# Patient Record
Sex: FEMALE
[De-identification: names, ages, dates, MRNs, and addresses within clinical notes are randomized per-mention and may not be internally consistent; named-entity substitution may affect disease eponyms.]

---

## 2018-03-27 ENCOUNTER — HOSPITAL ENCOUNTER (EMERGENCY)
Dept: HOSPITAL 14 - H.ER | Age: 43
Discharge: HOME | End: 2018-03-27
Payer: SELF-PAY

## 2018-03-27 VITALS — TEMPERATURE: 98.3 F | SYSTOLIC BLOOD PRESSURE: 140 MMHG | DIASTOLIC BLOOD PRESSURE: 78 MMHG | HEART RATE: 88 BPM

## 2018-03-27 VITALS — RESPIRATION RATE: 18 BRPM | OXYGEN SATURATION: 99 %

## 2018-03-27 VITALS — BODY MASS INDEX: 34.9 KG/M2

## 2018-03-27 DIAGNOSIS — K29.70: Primary | ICD-10-CM

## 2018-03-27 LAB
ALBUMIN SERPL-MCNC: 4.3 G/DL (ref 3.5–5)
ALBUMIN/GLOB SERPL: 1.1 {RATIO} (ref 1–2.1)
ALT SERPL-CCNC: 44 U/L (ref 9–52)
APTT BLD: 30.8 SECONDS (ref 25.6–37.1)
AST SERPL-CCNC: 25 U/L (ref 14–36)
BASOPHILS # BLD AUTO: 0 K/UL (ref 0–0.2)
BASOPHILS NFR BLD: 0.4 % (ref 0–2)
BILIRUB UR-MCNC: NEGATIVE MG/DL
BUN SERPL-MCNC: 15 MG/DL (ref 7–17)
CALCIUM SERPL-MCNC: 9.6 MG/DL (ref 8.4–10.2)
COLOR UR: (no result)
EOSINOPHIL # BLD AUTO: 0.2 K/UL (ref 0–0.7)
EOSINOPHIL NFR BLD: 2.3 % (ref 0–4)
ERYTHROCYTE [DISTWIDTH] IN BLOOD BY AUTOMATED COUNT: 17.8 % (ref 11.5–14.5)
GFR NON-AFRICAN AMERICAN: > 60
GLUCOSE UR STRIP-MCNC: (no result) MG/DL
HGB BLD-MCNC: 10.6 G/DL (ref 12–16)
INR PPP: 1 (ref 0.9–1.2)
LEUKOCYTE ESTERASE UR-ACNC: (no result) LEU/UL
LIPASE SERPL-CCNC: 88 U/L (ref 23–300)
LYMPHOCYTES # BLD AUTO: 2.4 K/UL (ref 1–4.3)
LYMPHOCYTES NFR BLD AUTO: 31.4 % (ref 20–40)
MCH RBC QN AUTO: 24.8 PG (ref 27–31)
MCHC RBC AUTO-ENTMCNC: 33 G/DL (ref 33–37)
MCV RBC AUTO: 75.2 FL (ref 81–99)
MONOCYTES # BLD: 0.5 K/UL (ref 0–0.8)
MONOCYTES NFR BLD: 6 % (ref 0–10)
NEUTROPHILS # BLD: 4.6 K/UL (ref 1.8–7)
NEUTROPHILS NFR BLD AUTO: 59.9 % (ref 50–75)
NRBC BLD AUTO-RTO: 0.1 % (ref 0–0)
PH UR STRIP: 7 [PH] (ref 5–8)
PLATELET # BLD: 273 K/UL (ref 130–400)
PMV BLD AUTO: 8.1 FL (ref 7.2–11.7)
PROT UR STRIP-MCNC: NEGATIVE MG/DL
PROTHROMBIN TIME: 11.2 SECONDS (ref 9.8–13.1)
RBC # BLD AUTO: 4.29 MIL/UL (ref 3.8–5.2)
RBC # UR STRIP: NEGATIVE /UL
SP GR UR STRIP: 1.01 (ref 1–1.03)
SQUAMOUS EPITHIAL: < 1 /HPF (ref 0–5)
URINE CLARITY: CLEAR
UROBILINOGEN UR-MCNC: (no result) MG/DL (ref 0.2–1)
WBC # BLD AUTO: 7.6 K/UL (ref 4.8–10.8)

## 2018-03-27 PROCEDURE — 96360 HYDRATION IV INFUSION INIT: CPT

## 2018-03-27 PROCEDURE — 99285 EMERGENCY DEPT VISIT HI MDM: CPT

## 2018-03-27 PROCEDURE — 81025 URINE PREGNANCY TEST: CPT

## 2018-03-27 PROCEDURE — 85025 COMPLETE CBC W/AUTO DIFF WBC: CPT

## 2018-03-27 PROCEDURE — 85730 THROMBOPLASTIN TIME PARTIAL: CPT

## 2018-03-27 PROCEDURE — 83690 ASSAY OF LIPASE: CPT

## 2018-03-27 PROCEDURE — 81003 URINALYSIS AUTO W/O SCOPE: CPT

## 2018-03-27 PROCEDURE — 86900 BLOOD TYPING SEROLOGIC ABO: CPT

## 2018-03-27 PROCEDURE — 80053 COMPREHEN METABOLIC PANEL: CPT

## 2018-03-27 PROCEDURE — 85610 PROTHROMBIN TIME: CPT

## 2018-03-27 PROCEDURE — 86850 RBC ANTIBODY SCREEN: CPT

## 2018-03-27 NOTE — CP.PCM.CON
History of Present Illness





- History of Present Illness


History of Present Illness: 


General Surgery Consult Note for Dr. Cardenas





This 43F with no PMH presents to the ED due to 2 months of constant abdominal 

pain. Nothing makes it better and food makes it worse particularly coffee and 

tea. She has not taken any medication for this abdominal pain. She was seen 

earlier today in clinic and went for an outpatient US which showed GB stones a 

4mm CBD and a 3.15mm GB wall. She denies any fevers or chills at home reports 

nausea no vomiting. She has regular bowel movements. 





PMH: Denies


PSH: Denies


ALL: NKDA


Social: 1/4ppd, denies ETOH or drugs








Review of Systems





- Review of Systems


All systems: reviewed and no additional remarkable complaints except





- Gastrointestinal


Gastrointestinal: Abdominal Pain, Dyspepsia, Nausea.  absent: Dysphagia, 

Hematemesis, Hematochezia, Loose Stools, Vomiting





Past Patient History





- Infectious Disease


Hx of Infectious Diseases: None





- Past Social History


Smoking Status: Never Smoked





- MUSCULOSKELETAL/RHEUMATOLOGICAL


Hx Falls: No





- PSYCHIATRIC


Hx Substance Use: No





- SURGICAL HISTORY


Hx Surgeries: No





- ANESTHESIA


Hx Anesthesia: No





Meds


Home Medications: 


 Home Medication List











 Medication  Instructions  Recorded  Confirmed  Type


 


Pantoprazole Sodium [Protonix] 40 mg PO DAILY PRN #10 tablet. 03/27/18  Rx











Allergies/Adverse Reactions: 


 Allergies











Allergy/AdvReac Type Severity Reaction Status Date / Time


 


No Known Allergies Allergy   Verified 06/06/16 23:27














- Medications


Medications: 


 Current Medications





Sodium Chloride (Sodium Chloride 0.9%)  1,000 mls @ 150 mls/hr IV .Q6H40M STA


   Stop: 03/27/18 22:43


   Last Admin: 03/27/18 17:28 Dose:  150 mls/hr











Physical Exam





- Constitutional


Appears: Non-toxic, No Acute Distress





- Head Exam


Head Exam: ATRAUMATIC, NORMOCEPHALIC





- Eye Exam


Eye Exam: EOMI, Normal appearance





- ENT Exam


ENT Exam: Mucous Membranes Moist





- Respiratory Exam


Respiratory Exam: NORMAL BREATHING PATTERN





- Cardiovascular Exam


Cardiovascular Exam: +S1, +S2





- GI/Abdominal Exam


GI & Abdominal Exam: Soft, Tenderness.  absent: Distended, Firm, Guarding, 

Hernia, Rigid


Additional comments: 


Generalized tenderness most prominent in epigastrium, no murphys sign








- Neurological Exam


Neurological exam: Alert, Oriented x3





- Psychiatric Exam


Psychiatric exam: Normal Affect, Normal Mood





- Skin


Skin Exam: Dry, Intact





Results





- Vital Signs


Recent Vital Signs: 


 Last Vital Signs











Temp  98.4 F   03/27/18 14:30


 


Pulse  74   03/27/18 14:30


 


Resp  18   03/27/18 14:30


 


BP  137/67   03/27/18 14:30


 


Pulse Ox  99   03/27/18 18:38














- Labs


Result Diagrams: 


 03/27/18 16:50





 03/27/18 16:50


Labs: 


 Laboratory Results - last 24 hr











  03/27/18 03/27/18 03/27/18





  16:25 16:50 16:50


 


WBC   7.6 


 


RBC   4.29 


 


Hgb   10.6 L 


 


Hct   32.3 L 


 


MCV   75.2 L 


 


MCH   24.8 L 


 


MCHC   33.0 


 


RDW   17.8 H 


 


Plt Count   273 


 


MPV   8.1 


 


Neut % (Auto)   59.9 


 


Lymph % (Auto)   31.4 


 


Mono % (Auto)   6.0 


 


Eos % (Auto)   2.3 


 


Baso % (Auto)   0.4 


 


Neut # (Auto)   4.6 


 


Lymph # (Auto)   2.4 


 


Mono # (Auto)   0.5 


 


Eos # (Auto)   0.2 


 


Baso # (Auto)   0.0 


 


PT   


 


INR   


 


APTT   


 


Sodium    141


 


Potassium    4.3


 


Chloride    102


 


Carbon Dioxide    24


 


Anion Gap    19


 


BUN    15


 


Creatinine    0.7


 


Est GFR ( Amer)    > 60


 


Est GFR (Non-Af Amer)    > 60


 


Random Glucose    108 H


 


Calcium    9.6


 


Total Bilirubin    0.2


 


AST    25


 


ALT    44


 


Alkaline Phosphatase    92


 


Total Protein    8.0


 


Albumin    4.3


 


Globulin    3.7


 


Albumin/Globulin Ratio    1.1


 


Lipase    88


 


Urine Color   


 


Urine Clarity   


 


Urine pH   


 


Ur Specific Gravity   


 


Urine Protein   


 


Urine Glucose (UA)   


 


Urine Ketones   


 


Urine Blood   


 


Urine Nitrate   


 


Urine Bilirubin   


 


Urine Urobilinogen   


 


Ur Leukocyte Esterase   


 


Urine RBC (Auto)   


 


Urine Microscopic WBC   


 


Ur Squamous Epith Cells   


 


Blood Type  O POSITIVE  


 


Antibody Screen  Negative  


 


BBK History Checked  Patient has bt  














  03/27/18 03/27/18





  16:50 17:30


 


WBC  


 


RBC  


 


Hgb  


 


Hct  


 


MCV  


 


MCH  


 


MCHC  


 


RDW  


 


Plt Count  


 


MPV  


 


Neut % (Auto)  


 


Lymph % (Auto)  


 


Mono % (Auto)  


 


Eos % (Auto)  


 


Baso % (Auto)  


 


Neut # (Auto)  


 


Lymph # (Auto)  


 


Mono # (Auto)  


 


Eos # (Auto)  


 


Baso # (Auto)  


 


PT  11.2 


 


INR  1.0 


 


APTT  30.8 


 


Sodium  


 


Potassium  


 


Chloride  


 


Carbon Dioxide  


 


Anion Gap  


 


BUN  


 


Creatinine  


 


Est GFR ( Amer)  


 


Est GFR (Non-Af Amer)  


 


Random Glucose  


 


Calcium  


 


Total Bilirubin  


 


AST  


 


ALT  


 


Alkaline Phosphatase  


 


Total Protein  


 


Albumin  


 


Globulin  


 


Albumin/Globulin Ratio  


 


Lipase  


 


Urine Color   Straw


 


Urine Clarity   Clear


 


Urine pH   7.0


 


Ur Specific Gravity   1.013


 


Urine Protein   Negative


 


Urine Glucose (UA)   Neg


 


Urine Ketones   Negative


 


Urine Blood   Negative


 


Urine Nitrate   Negative


 


Urine Bilirubin   Negative


 


Urine Urobilinogen   0.2-1.0


 


Ur Leukocyte Esterase   Neg


 


Urine RBC (Auto)   1


 


Urine Microscopic WBC   < 1


 


Ur Squamous Epith Cells   < 1


 


Blood Type  


 


Antibody Screen  


 


BBK History Checked  














Assessment & Plan





- Assessment and Plan (Free Text)


Assessment: 


43F with abdominal pain secondary to biliary colic vs gastritis








Vital Signs stable


No Leukocytosis or Left Shift


No elevation of liver enzymes


GB US shows stones without any significant sonographic signs of acute 

cholecystitis


Recommend PPI


Return to ed if you develop fevers chills chest pain SOB or any other alarming 

symptoms


Followup with Dr. Cardenas in office tomorrow


Discussed with Dr. Theresa Hamilton PGY2

## 2018-03-27 NOTE — ED PDOC
HPI: Abdomen


Time Seen by Provider: 03/27/18 15:30


Chief Complaint (Nursing): Abdominal Pain


History Per: Patient


Additional Complaint(s): 





Pt. states she had an abd US done today and was called by [facility] to come to ED as 

she may have cholecystitis. Reports she's had RUQ pain since 2/14/2018 and she 

was seen in [facility] ED and admitted on 02/19/2018 for pancolitis and also had 

gallstones at that time but no cholecystitis. RUQ pain has been intermittent 

but has not increased in severity from 02/14/2018. Reports also having 

intermittent nausea but no vomiting. Pain is worse after eating any types of 

food. Denies fever, vomiting, diarrhea, previous abdominal surgeries, chest pain

, SOB, palpitations, hemoptysis. 





Past Medical History


Reviewed: Historical Data, Nursing Documentation, Vital Signs


Vital Signs: 


 Last Vital Signs











Temp  98.3 F   03/27/18 18:50


 


Pulse  88   03/27/18 18:50


 


Resp  18   03/27/18 18:50


 


BP  140/78   03/27/18 18:50


 


Pulse Ox  99   03/27/18 18:50














- Medical History


PMH: 


   Denies: Diabetes





- Family History


Family History: States: Unknown Family Hx





- Home Medications


Home Medications: 


 Ambulatory Orders











 Medication  Instructions  Recorded


 


Ibuprofen [Motrin Tab] 400 mg PO PRN PRN 02/20/18


 


Acetaminophen [Tylenol 325mg tab] 650 mg PO Q6 PRN  tab 02/21/18


 


Ciprofloxacin HCl [Cipro] 500 mg PO Q12H 7 Days #14 tablet 02/21/18


 


Metronidazole [Flagyl] 500 mg PO TID 7 Days #21 tablet 02/21/18


 


oxyCODONE/Acetaminophen [Percocet 1 ea PO Q6H #20 tab 02/21/18





5/325 mg Tab]  


 


Pantoprazole Sodium [Protonix] 40 mg PO DAILY PRN #10 tablet. 03/27/18














- Allergies


Allergies/Adverse Reactions: 


 Allergies











Allergy/AdvReac Type Severity Reaction Status Date / Time


 


No Known Allergies Allergy   Verified 06/06/16 23:27














Review of Systems


ROS Statement: Except As Marked, All Systems Reviewed And Found Negative


Gastrointestinal: Positive for: Nausea, Abdominal Pain





Physical Exam





- Physical Exam


Appears: Positive for: Well, Non-toxic, No Acute Distress


Skin: Positive for: Normal Color, Warm.  Negative for: Rash, Jaundice


Eye Exam: Positive for: Normal appearance.  Negative for: Scleral icterus


Cardiovascular/Chest: Positive for: Regular Rate, Rhythm


Respiratory: Positive for: Normal Breath Sounds.  Negative for: Respiratory 

Distress


Gastrointestinal/Abdominal: Positive for: Normal Exam, Bowel Sounds, Soft, 

Tenderness (minimal epigastric tenderness; no RUQ tenderness; negative Baum's 

sign).  Negative for: Organomegaly


Back: Positive for: Normal Inspection.  Negative for: L CVA Tenderness, R CVA 

Tenderness


Neurologic/Psych: Positive for: Alert, Oriented.  Negative for: Aphasia, Facial 

Droop





- Laboratory Results


Result Diagrams: 


 03/27/18 16:50





 03/27/18 16:50





- ECG


O2 Sat by Pulse Oximetry: 99





- Progress


ED Course And Treament: 





Labs ordered.


Abd US (03/27/2018): cholelithiasis with borderline wall thickening and 

positive sonographic Baum's sign; findings are non-specific but raise the 

possibility of acute cholecystitis 





1830


Pt. evaluated by Dr. Hamilton and who then spoke with Dr. Cardenas regarding case 

and cleared pt. for discharge. Requests that pt. f/u in surgery clinic and for 

pt. to be prescribed protonix. 





1836


On re-evaluation, pt. in no distress. Informed of plan and instructed to f/u 

with [facility] for further evaluation. 





Disposition





- Clinical Impression


Clinical Impression: 


 Gastritis








- Patient ED Disposition


Is Patient to be Admitted: No





- Disposition


Referrals: 


[facility] [Outside]


Disposition: Routine/Home


Disposition Time: 18:37


Condition: IMPROVED


Additional Instructions: 


Follow up with [facility] for further evaluation.


Return to ED immediately if symptoms persist or worsen. 


Prescriptions: 


Pantoprazole Sodium [Protonix] 40 mg PO DAILY PRN #10 tablet.dr


 PRN Reason: abdominal pain


Instructions:  Gastritis (DC)


Forms:  TxtFeedback (Azerbaijani)


Print Language: Omani

## 2018-08-01 ENCOUNTER — HOSPITAL ENCOUNTER (OUTPATIENT)
Dept: HOSPITAL 14 - H.OPSURG | Age: 43
Discharge: HOME | End: 2018-08-01
Payer: SELF-PAY

## 2018-08-01 VITALS
DIASTOLIC BLOOD PRESSURE: 66 MMHG | HEART RATE: 54 BPM | RESPIRATION RATE: 18 BRPM | SYSTOLIC BLOOD PRESSURE: 129 MMHG | TEMPERATURE: 99.1 F

## 2018-08-01 VITALS — BODY MASS INDEX: 33.9 KG/M2

## 2018-08-01 VITALS — OXYGEN SATURATION: 99 %

## 2018-08-01 DIAGNOSIS — R12: ICD-10-CM

## 2018-08-01 DIAGNOSIS — D64.9: ICD-10-CM

## 2018-08-01 DIAGNOSIS — K80.20: Primary | ICD-10-CM

## 2018-08-01 PROCEDURE — 36415 COLL VENOUS BLD VENIPUNCTURE: CPT

## 2018-08-01 PROCEDURE — 86900 BLOOD TYPING SEROLOGIC ABO: CPT

## 2018-08-01 PROCEDURE — 88304 TISSUE EXAM BY PATHOLOGIST: CPT

## 2018-08-01 PROCEDURE — 86850 RBC ANTIBODY SCREEN: CPT

## 2018-08-01 PROCEDURE — 47562 LAPAROSCOPIC CHOLECYSTECTOMY: CPT

## 2018-08-01 NOTE — PCM.SURG1
Surgeon's Initial Post Op Note





- Surgeon's Notes


Surgeon: Dr. Jacobs


Assistant: Dr. Vee PGY2, Dr. Caicedo PGY4


Type of Anesthesia: General Endo


Anesthesia Administered By: Yong


Pre-Operative Diagnosis: Symptomatic Cholelithiasis


Operative Findings: Chronic Cholecystitis, Cholelithiasis


Post-Operative Diagnosis: Chronic Cholecystitis, Cholelithiasis


Operation Performed: Laparoscopic Cholecystectomy


Specimen/Specimens Removed: Gallbladder


Estimated Blood Loss: EBL {In ML}: 5


Blood Products Given: N/A


Drains Used: No Drains


Post-Op Condition: Good


Date of Surgery/Procedure: 08/01/18


Time of Surgery/Procedure: 13:59

## 2018-08-01 NOTE — CP.SDSHP
<Inez Vee - Last Filed: 08/01/18 13:54>





[facility] Day Surgery [facility]





- History


Proposed Procedure: Laparoscopic cholecystectomy


Pre-Op Diagnosis: Cholelithiasis





- Previous Medical/Surgical History


Pain: 4.Moderate Pain


Previous Surgical History: Denies





- Allergies


Allergies: 


Allergies





No Known Allergies Allergy (Verified 08/01/18 11:34)


 











- Physical Exam


General Appearance: NAD, well developed


Vital Signs: 


 Vital Signs











  08/01/18 08/01/18





  11:14 11:20


 


Temperature 98.5 F 


 


Pulse Rate 53 L 53 L


 


Respiratory 20 





Rate  


 


Blood Pressure 125/66 


 


O2 Sat by Pulse 100 





Oximetry  











Mental Status: Alert & Oriented x3


Neuro: WNL


Heart: WNL


Lungs: WNL


GI: Other (Tender to palpation in epigastric area and LUQ, no masses appreciated

)


Social History: Smoking (10 yr hx of #3 cigarettes daily)





- Impression


Impression: symptomatic cholelithiasis





- Date & Time


Date: 08/01/18


Time: 11:56





Short Stay Discharge





- Short Stay Discharge


Admitting Diagnosis/Reason for Visit: K80.20


Disposition: HOME/ ROUTINE


Referrals: 


Pooja Mccauley MD [Primary Care Provider] - 


Follow-up: 





Please call Dr. Jacobs's clinic to follow up in 1-2 weeks


Instructions:  Cholecystectomy, Laparoscopic Surgery, Gallstones (DC)


Additional Instructions (Diet, Activity): 


Ok to resume normal diet


No heavy lifting for 4-6 weeks


You have glue over your incisions, please do not remove it, it will fall off on 

it's own


Ok to shower





<Verenice Caicedo - Last Filed: 08/01/18 14:00>





[facility]





- Allergies


Allergies: 


Allergies





No Known Allergies Allergy (Verified 08/01/18 11:34)


 











- Physical Exam


Vital Signs: 


 Vital Signs











  08/01/18 08/01/18





  11:14 11:20


 


Temperature 98.5 F 


 


Pulse Rate 53 L 53 L


 


Respiratory 20 





Rate  


 


Blood Pressure 125/66 


 


O2 Sat by Pulse 100 





Oximetry

## 2018-08-02 NOTE — OP
Copied To:  Verenice Caicedo DO

Attending MD:  Denis Jacobs MD



PROCEDURE DATE:  08/01/2018



SURGEON:  Denis Jacobs MD



ASSISTANTS:  Verenice Caicedo DO, PGY-4 and Inez Vee DO, PGY-2



ANESTHESIOLOGIST:  Dr. JORGE L Beach



ANESTHESIA:  General endotracheal.



PREOPERATIVE DIAGNOSIS:  Symptomatic cholelithiasis.



POSTOPERATIVE DIAGNOSIS:  Chronic cholelithiasis.



FINDINGS:  Chronically inflamed gallbladder.



BLOOD LOSS:  5 mL.



DRAINS:  None.



COMPLICATIONS:  None.



DESCRIPTION OF PROCEDURE:  This is a 43-year-old obese prediabetic female

patient who presented after being evaluated with intermittent right upper

quadrant pain for the past three months, seen in the emergency room,

diagnosed with cholelithiasis without cholecystitis.  _____ to schedule

elective cholecystectomy.



The patient was consented for laparoscopic cholecystectomy, possible open.



The patient was placed on the operating table in the supine position. 

General anesthesia was induced.  A timeout was completed verifying correct

patient, procedure site, positioning prior to the procedure.  Preoperative

antibiotics were administered.  A nasogastric tube was inserted.  The

abdomen was prepped and draped in the usual sterile fashion.  An

infraumbilical incision was made.  The Veress needle was introduced, and

the abdomen was insufflated.  An 11 trocar was inserted into the

infraumbilical incision.  Camera introduced.  An epigastric incision was

completed for a size 11 trocar, and then two 5 mm trocar incisions were

made in the left mid axillary flank, lower abdominal quadrant.  We

attempted to grasp the fundus of the gallbladder, and the decision was to

decompress the gallbladder using a needle for decompression which was

performed with findings of hydrops of the gallbladder.  After decompression

of the gallbladder, the fundus of the gallbladder was grasped and retracted

to the contralateral shoulder above the liver.  An extensive number of

adhesions of omentum were noted on the body of the gallbladder and were

bluntly dissected off the gallbladder.  Traction was placed on the fundus

of the gallbladder.  The cystic duct was dissected and identified, and

three clips were placed prior to division of the cystic duct.  Dissection

was performed posterior to the cystic duct.  The cystic artery was

identified and triply clipped and then divided starting at the fundus and

continuing downward to the cystic duct and artery.  The peritoneum

overlying was excised, and the gallbladder was dissected off the liver bed.

The gallbladder was successfully removed from the liver bed using spatula

electrocautery with good hemostasis.  The gallbladder was then placed into

an EndoCatch bag and removed through the infraumbilical incision.  The

infraumbilical incision was closed using a _____ suture in a

figure-of-eight configuration with a safety suture also applied.  The

fascia was closed using the 0 Vicryl suture.  The skin was then closed

using 4-0 Monocryl with good hemostasis.  The patient tolerated the

procedure well, was extubated in the operating room, and taken to the

postanesthesia care unit in stable condition.







__________________________________________

Verenice Caicedo DO





__________________________________________

Denis Jacobs MD



DD:  08/01/2018 14:15:58

DT:  08/02/2018 0:29:24

Job # [id number]

## 2019-02-25 ENCOUNTER — HOSPITAL ENCOUNTER (EMERGENCY)
Dept: HOSPITAL 14 - H.ER | Age: 44
LOS: 1 days | Discharge: HOME | End: 2019-02-26
Payer: SELF-PAY

## 2019-02-25 VITALS — BODY MASS INDEX: 33.9 KG/M2

## 2019-02-25 VITALS — OXYGEN SATURATION: 100 %

## 2019-02-25 DIAGNOSIS — O20.0: Primary | ICD-10-CM

## 2019-02-25 DIAGNOSIS — O26.899: ICD-10-CM

## 2019-02-25 LAB
BASOPHILS # BLD AUTO: 0 K/UL (ref 0–0.2)
BASOPHILS NFR BLD: 0.4 % (ref 0–2)
EOSINOPHIL # BLD AUTO: 0.2 K/UL (ref 0–0.7)
EOSINOPHIL NFR BLD: 2.8 % (ref 0–4)
ERYTHROCYTE [DISTWIDTH] IN BLOOD BY AUTOMATED COUNT: 19.2 % (ref 11.5–14.5)
HGB BLD-MCNC: 9.4 G/DL (ref 12–16)
LYMPHOCYTES # BLD AUTO: 1.9 K/UL (ref 1–4.3)
LYMPHOCYTES NFR BLD AUTO: 22.9 % (ref 20–40)
MCH RBC QN AUTO: 23.1 PG (ref 27–31)
MCHC RBC AUTO-ENTMCNC: 32.2 G/DL (ref 33–37)
MCV RBC AUTO: 71.8 FL (ref 81–99)
MONOCYTES # BLD: 0.5 K/UL (ref 0–0.8)
MONOCYTES NFR BLD: 6.3 % (ref 0–10)
NEUTROPHILS # BLD: 5.7 K/UL (ref 1.8–7)
NEUTROPHILS NFR BLD AUTO: 67.6 % (ref 50–75)
NRBC BLD AUTO-RTO: 0.1 % (ref 0–0)
PLATELET # BLD: 252 K/UL (ref 130–400)
PMV BLD AUTO: 8.1 FL (ref 7.2–11.7)
RBC # BLD AUTO: 4.09 MIL/UL (ref 3.8–5.2)
WBC # BLD AUTO: 8.4 K/UL (ref 4.8–10.8)

## 2019-02-25 PROCEDURE — 96360 HYDRATION IV INFUSION INIT: CPT

## 2019-02-25 PROCEDURE — 84702 CHORIONIC GONADOTROPIN TEST: CPT

## 2019-02-25 PROCEDURE — 76817 TRANSVAGINAL US OBSTETRIC: CPT

## 2019-02-25 PROCEDURE — 99283 EMERGENCY DEPT VISIT LOW MDM: CPT

## 2019-02-25 PROCEDURE — 85025 COMPLETE CBC W/AUTO DIFF WBC: CPT

## 2019-02-25 PROCEDURE — 81025 URINE PREGNANCY TEST: CPT

## 2019-02-25 NOTE — ED PDOC
HPI: Abdomen


Time Seen by Provider: 02/25/19 19:31


Chief Complaint (Nursing): GI Problem


Chief Complaint (Provider): Nausea


History Per: Patient


History/Exam Limitations: no limitations


Onset/Duration Of Symptoms: Days (x7)


Current Symptoms Are (Timing): Still Present


Additional Complaint(s): 


43 y/o female with a PMHx of anemia presents to the ED for evaluation of nausea 

associated with lower abdominal pain and breast tenderness, onset one week ago. 

Patient notes last normal menstrual period was on 01/31/2019. Patient reports of

feeling tired since onset. Otherwise, patient denies vaginal discharge or 

bleeding. 





PMD: Clinic


Last Menstral Period: 01/31/2019





Past Medical History


Reviewed: Historical Data, Nursing Documentation, Vital Signs


Vital Signs: 





                                Last Vital Signs











Temp  98.6 F   02/25/19 19:16


 


Pulse  68   02/25/19 19:16


 


Resp  18   02/25/19 19:16


 


BP  121/83   02/25/19 19:16


 


Pulse Ox  100   02/25/19 19:16














- Medical History


PMH: Anemia


   Denies: Diabetes, Chronic Kidney Disease





- Surgical History


Surgical History: Cholecystectomy





- Family History


Family History: States: No Known Family Hx





- Social History


Current smoker - smoking cessation education provided: No





- Home Medications


Home Medications: 


                                Ambulatory Orders











 Medication  Instructions  Recorded


 


oxyCODONE/Acetaminophen [Percocet 1 tab PO Q4 PRN MDD n 08/01/18





5/325 mg Tab]  


 


Meclizine [Meclizine*] 25 mg PO Q6 PRN #30 tab 02/26/19


 


Prenatal Multivit/Folic Acid/I 1 tab PO DAILY #100 tab 02/26/19





[Prenatal Plus]  














- Allergies


Allergies/Adverse Reactions: 


                                    Allergies











Allergy/AdvReac Type Severity Reaction Status Date / Time


 


No Known Allergies Allergy   Verified 08/01/18 11:34














Review of Systems


ROS Statement: Except As Marked, All Systems Reviewed And Found Negative (as per

HPI)


Constitutional: Positive for: Other (Breast tenderness)


Gastrointestinal: Positive for: Nausea, Abdominal Pain





Physical Exam





- Reviewed


Nursing Documentation Reviewed: Yes


Vital Signs Reviewed: Yes





- Physical Exam


Appears: Positive for: No Acute Distress


Head Exam: Positive for: ATRAUMATIC, NORMOCEPHALIC


Skin: Positive for: Warm, Dry


Eye Exam: Positive for: EOMI, PERRL


Respiratory: Positive for: Normal Breath Sounds.  Negative for: Respiratory 

Distress


Gastrointestinal/Abdominal: Positive for: Soft, Tenderness (suprpubic tenderness

to palpation).  Negative for: Mass, Guarding, Rebound


Back: Positive for: Normal Inspection.  Negative for: Decreased ROM


Extremity: Negative for: Pedal Edema, Deformity


Neurologic/Psych: Positive for: Alert, Oriented





- Laboratory Results


Result Diagrams: 


                                 02/25/19 20:55








- ECG


O2 Sat by Pulse Oximetry: 100 (RA)


Pulse Ox Interpretation: Normal





- CT Scan/US


  ** US OB transvaginal


Other Rad Studies (CT/US): Read By Radiologist, Radiology Report Reviewed (see 

MDM note)





Medical Decision Making


Medical Decision Making: 


Time: 1958


Impression: Pelvic pain and early pregnancy


Differentials include but not limited to ectopic pregnancy, early pregnancy, 

UTI, Cystitis and vomiting and pregnancy.


Plan:


-- Beta-HCG, Quantitative 


-- ED Urine Pregnancy


-- ED Urine Dipstick


-- CBC with Differentials


-- Sodium Chloride IV 1000 mls/hr


-- IV Insertion


-- US OB Transvaginal Pregnancy





23:02


US OB transvaginal read and reviewed by radiologist


FINDINGS: 





GESTATION: 


UTERUS: 


Uterus measures 10.4 x 6.5 x 8.9 cm and appears anteverted. 





CERVIX: 


Cervix measures 4.7 cm in length and appears closed. 





OVARIES: 


Unremarkable. No mass. 





FREE FLUID: 


No free fluid. 





MISCELLANEOUS: 


Inferior to the gestational sac, there is a subchorionic hemorrhage measuring 

1.9 x 1.3 x 2.2 cm. 


A gestational sac is present. 


Mean sac diameter measures 3.0 cm compatible with an estimated ultrasound age of

8.0 weeks. 


A yolk sac is present. 


The fetal pole is present. 


Crown rump length measures 1.8 cm compatible with an estimated ultrasound age of

8.2 weeks. 


Fetal heart motion is observed at 168 beats per minute. 


Right ovary measures 2.5 x 2.0 x 2.4 cm. 


Normal Doppler flow is seen in the right ovary. 


The left ovary is not adequately visualized by ultrasound. 





IMPRESSION: 





1. Single live intrauterine gestation of approximately 8.2 weeks. 


2. Subchorionic hemorrhage as described. 


3. Fetal heart motion is observed as described. 


4. Nonvisualization of the left ovary.


-----------------------------





DW pt findings and plan of care.


Threatened miscarriage instructions for subchorionic hemorrhage in .


followup with women's health


 

________________________________________________________________________________


___________


Scribe Attestation:


Documented by Rylan Kamara, acting as a scribe for Kristin Leal MD.





Provider Scribe Attestation:


All medical record entries made by the Scribe were at my direction and 

personally dictated by me. I have reviewed the chart and agree that the record 

accurately reflects my personal performance of the history, physical exam, 

medical decision making, and the department course for this patient. I have also

personally directed, reviewed, and agree with the discharge instructions and 

disposition.





Documented by Patricia Vega, acting as a scribe for Kristin Leal MD.





Provider Scribe Attestation:


All medical record entries made by the Scribe were at my direction and 

personally dictated by me. I have reviewed the chart and agree that the record 

accurately reflects my personal performance of the history, physical exam, 

medical decision making, and the department course for this patient. I have also

personally directed, reviewed, and agree with the discharge instructions and 

disposition.





Disposition





- Clinical Impression


Clinical Impression: 


 Threatened miscarriage, Abdominal pain in pregnancy





Counseled Patient/Family Regarding: Studies Performed, Diagnosis, Need For 

Followup, Rx Given





- Disposition


[facility]errals[facility] 


[facility] ([facility] 

CHAD EN 2-3 MCKAY A CONTINUAR TRATIMIENTE)


Disposition: Routine/Home


Disposition Time: 23:50


Condition: STABLE


Prescriptions: 


Meclizine [Meclizine*] 25 mg PO Q6 PRN #30 tab


 PRN Reason: Nausea/Vomiting


Prenatal Multivit/Folic Acid/I [Prenatal Plus] 1 tab PO DAILY #100 tab


Instructions:  Threatened Miscarriage (DC), Stomach Pain in Early Pregnancy


Print Language: Omani

## 2019-02-26 VITALS
DIASTOLIC BLOOD PRESSURE: 54 MMHG | HEART RATE: 69 BPM | SYSTOLIC BLOOD PRESSURE: 129 MMHG | TEMPERATURE: 99 F | RESPIRATION RATE: 20 BRPM

## 2019-02-26 NOTE — US
Date of service: 



02/25/2019



PROCEDURE:  First trimester fetal ultrasound



HISTORY:

pregnant pelvic pain



COMPARISON:

None



TECHNIQUE:

Standard protocol for this study/examination.



FINDINGS:

LMP: Unknown



Prior examinations from the current pregnancy: None



TECHNIQUE: Real-time 2D imaging, duplex and color Doppler.



FINDINGS:



Cardiac activity: Present



Rate: 168 BPM



Measurements:



Crown rump length: 1.79 cm



Gestational age based on CRL   8 weeks 2 days 



Gestational age 8 weeks based on gestational sac measurement 3.04 cm



Gestational age derived from  LMP:   Cannot be ascertained based in 

the absence of a reliable/ known LMP



GISELLE based on LMP: Cannot be ascertained based in the absence of a 

reliable/ known LMP



GISELLE based on biometry: 10/06/2019



Gestational concordance  cannot be determined absent reliable LMP



Yolk sac  identified 



Cervix: No Cervical abnormalities: Negative examination for cervical 

dilatation or effacement. 



Closed cervix measuring 4.67 cm



Subchorionic hemorrhage: None 



UTERUS: 6.5 x 8.9 x 10.4 cm. Location of fibroid and size: Submucosal 

midline interposed between the gestational sac and cervix measures 

1.3 x 1.9 x 2.2 cm 



ADNEXA:



Right: 2 x 2.4 x 2.5 cm.   Normal Doppler arterial waveform 

documented.



Left: Not visible 



Fluid in the cul-de-sac: None.



IMPRESSION:

Eight weeks 1 day live intrauterine gestation.  Absence of LMP 

precludes evaluation for gestational concordance.



Limitations of the current examination: Non visible left adnexa.



___________________________________________________



Concordant findings (preliminary report) provided by [facility].

## 2019-03-14 ENCOUNTER — HOSPITAL ENCOUNTER (EMERGENCY)
Dept: HOSPITAL 14 - H.ER | Age: 44
Discharge: HOME | End: 2019-03-14
Payer: SELF-PAY

## 2019-03-14 VITALS
TEMPERATURE: 98.6 F | DIASTOLIC BLOOD PRESSURE: 75 MMHG | SYSTOLIC BLOOD PRESSURE: 138 MMHG | RESPIRATION RATE: 16 BRPM | OXYGEN SATURATION: 100 % | HEART RATE: 69 BPM

## 2019-03-14 VITALS — BODY MASS INDEX: 33.9 KG/M2

## 2019-03-14 DIAGNOSIS — R52: ICD-10-CM

## 2019-03-14 DIAGNOSIS — O26.891: ICD-10-CM

## 2019-03-14 DIAGNOSIS — M25.532: Primary | ICD-10-CM

## 2019-03-14 DIAGNOSIS — Z3A.10: ICD-10-CM

## 2019-03-14 DIAGNOSIS — M79.642: ICD-10-CM
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
## 2019-03-14 NOTE — ED PDOC
HPI: Trauma/Fall





- HPI


Time Seen by Provider: 03/14/19 20:24


Chief Complaint (Nursing): Trauma


Chief Complaint (Provider): fall


History Per: Patient, Family (son)


History/Exam Limitations: no limitations


Injury Occurred (Timing): Just Before Arrival


Additional Complaint(s): 





45 y/o female, approximately 10 weeks pregnant, presents for evaluation of fall 

prior to arrival.  Patient states she tripped going down steps in her home, 

tried to use left hand to hold on to wall to prevent fall, which was unable to 

hold her and it slipped, causing her to fall onto left buttock and slide down 3 

steps.  Patient complaining of left buttock pain, left thigh pain, left hand 

pain, and lower abdominal pain.  Denies head injury, neck pain, back pain, 

bowel/bladder incontinence, numbness/weakness of lower extremities, dysuria, 

hematuria, vaginal bleeding/discharge





 





Past Medical History


Reviewed: Historical Data, Nursing Documentation, Vital Signs


Vital Signs: 





                                Last Vital Signs











Temp  98.6 F   03/14/19 20:13


 


Pulse  69   03/14/19 20:13


 


Resp  16   03/14/19 20:13


 


BP  138/75   03/14/19 20:13


 


Pulse Ox  100   03/14/19 20:13














- Medical History


PMH: Anemia


   Denies: Diabetes, Chronic Kidney Disease





- Surgical History


Surgical History: Appendectomy, Cholecystectomy





- Family History


Family History: States: Unknown Family Hx





- Home Medications


Home Medications: 


                                Ambulatory Orders











 Medication  Instructions  Recorded


 


oxyCODONE/Acetaminophen [Percocet 1 tab PO Q4 PRN MDD n 08/01/18





5/325 mg Tab]  


 


Meclizine [Meclizine*] 25 mg PO Q6 PRN #30 tab 02/26/19


 


Prenatal Multivit/Folic Acid/I 1 tab PO DAILY #100 tab 02/26/19





[Prenatal Plus]  














- Allergies


Allergies/Adverse Reactions: 


                                    Allergies











Allergy/AdvReac Type Severity Reaction Status Date / Time


 


No Known Allergies Allergy   Verified 03/14/19 20:11














Review of Systems


ROS Statement: Except As Marked, All Systems Reviewed And Found Negative


Genitourinary Female: Positive for: Pelvic Pain


Musculoskeletal: Positive for: Hand Pain (left), Leg Pain (left buttock, left 

leg)





Physical Exam





- Reviewed


Nursing Documentation Reviewed: Yes


Vital Signs Reviewed: Yes





- Physical Exam


Appears: Positive for: Well, Non-toxic, No Acute Distress


Head Exam: Positive for: ATRAUMATIC, NORMAL INSPECTION, NORMOCEPHALIC


Skin: Positive for: Normal Color


Eye Exam: Positive for: Normal appearance


ENT: Positive for: Normal ENT Inspection


Cardiovascular/Chest: Positive for: Regular Rate, Rhythm


Respiratory: Positive for: Normal Breath Sounds


Pulses-Radial (L): 2+


Pulses-Radial (R): 2+


Gastrointestinal/Abdominal: Positive for: Bowel Sounds, Soft.  Negative for: 

Tenderness


Back: Positive for: Muscle Spasm (left gluteal tenderness without edema, 

ecchymosis).  Negative for: L CVA Tenderness, R CVA Tenderness, Vertebral 

Tenderness, Decreased ROM


Extremity: Positive for: Tenderness (radial aspect left wrist, 1st metacarpal 

tender to palpate.  FROM.  Pain to left wrist with flexion/extension.  No 

snuffbox tenderness, swelling, deformity noted), Capillary Refill (<3 sec b/l 

UE)


Neurological/Psych: Positive for: Awake, Alert, Oriented (x3)





- ECG


O2 Sat by Pulse Oximetry: 100





- Other Rad


  ** xray left wrist


X-Ray: Viewed By Me


X-Ray Interpretation: no acute findings





  ** xray left hand


X-Ray: Viewed By Me, Read By Radiologist


X-Ray Interpretation: no acute findings





- Progress


ED Course And Treament: 





-upreg


-tylenol PO


-xray left hand/left wrist


-OB TV u/s





EXAM: 


US Obstetrical, Complete <14 weeks 





CLINICAL HISTORY: 


Fall pain 





TECHNIQUE: 


Transvaginal and transabdominal imaging of the maternal pelvis and a <14 week 

gestation with image documentation. 





COMPARISON: 


None provided. 





FINDINGS: 





GESTATION: 


A single living IUP identified estimated to be 11 weeks and 3 days in age, +/- 6

days. Fetal cardiac rate 175 BPM. The previously described inferior subchorionic

hemorrhage appears to have undergone partial resolution. 





UTERUS: 


Unremarkable. No myometrial mass. 





CERVIX: 


Closed. Unremarkable. 





OVARIES: 


Not well identified. 





FREE FLUID: 


No free fluid. 





IMPRESSION: 


1. Single living intrauterine pregnancy. No acute abnormality. 


2. There appears to have been appropriate interval fetal growth








Patient educated on findings, left wrist placed in left wrist immobilizer


Advised follow up PMD and Ob/Gyn within 2-3 days


RICE


Tylenol PRN pain


Return precautions given





Disposition





- Clinical Impression


Clinical Impression: 


 Left buttock pain, Left wrist pain, Left hand pain, Abdominal pain in pregnancy








- Patient ED Disposition


Is Patient to be Admitted: No


Counseled Patient/Family Regarding: Studies Performed, Diagnosis, Need For 

Followup





- Disposition


Disposition: Routine/Home


Disposition Time: 22:57


Condition: IMPROVED


Instructions:  Common Wrist Injuries (DC), Contusion (DC), Stomach Pain in Early

Pregnancy


Forms:  [employer] (Portuguese)


Print Language: Lao

## 2019-03-15 NOTE — RAD
PROCEDURE:  Left Hand Radiographs.



HISTORY:

 fall, pain 



COMPARISON:

None.



FINDINGS:



BONES:

Normal. No fracture. 



JOINTS:

Normal. No osteoarthritic changes. 



SOFT TISSUES:

Normal. 



OTHER FINDINGS:

None.



IMPRESSION:

No acute findings related to/ accounting  for the clinical 

presentation.



___________________________________________________



Concordant findings (preliminary report) provided by [facility].

## 2019-03-15 NOTE — US
Date of service: 



03/14/2019



PROCEDURE:  First trimester fetal ultrasound



HISTORY:

fall, pain; approx 10 wks gsetation



COMPARISON:

02/25/2019



TECHNIQUE:

Standard protocol for this study/examination.



FINDINGS:

LMP: Unknown



Prior examinations from the current pregnancy: 02/25/2019



TECHNIQUE: Real-time 2D imaging, duplex and color Doppler.



FINDINGS:



Cardiac activity: Present



Rate: 172 BPM



Measurements:



Crown rump length: 0.8 cm



Gestational age based on CRL   11 weeks 4 days 



Gestational age 11 weeks 2 days based on gestational sac measurement 

5.35 cm



Gestational age derived from  LMP:   Cannot be ascertained based in 

the absence of a reliable/ known LMP



GISELLE based on LMP: Cannot be ascertained based in the absence of a 

reliable/ known LMP



GISELLE based on biometry: 09/30/2019



Yolk sac  identified 



Cervix: No Cervical abnormalities: Negative examination for cervical 

dilatation or effacement. 



Closed cervix measuring 4.60 cm



Subchorionic hemorrhage: None 



UTERUS: 8.7 x 11.2 x 13.7 cm.  Submucosal fibroid midline lower 

uterine segment 2.2 x 2.4 x 2.7 cm. 



ADNEXA:



Not visible.



Not visible 



Fluid in the cul-de-sac: None



IMPRESSION:

Eleven weeks 3 days live intrauterine gestation.  Adequate 

progression compared to the prior study.



___________________________________________________



Concordant findings (preliminary report) provided by [facility].

## 2019-03-15 NOTE — RAD
Date of service: 



03/14/2019



PROCEDURE:  Left Wrist Radiographs.







HISTORY:

fall, pain



COMPARISON:

None.



FINDINGS:



BONES:

Normal. No fracture. 



JOINTS:

Normal. No dislocation. 



SOFT TISSUES:

Normal. 



OTHER FINDINGS:

None.



IMPRESSION:

Normal left wrist radiographs.

## 2019-06-01 ENCOUNTER — HOSPITAL ENCOUNTER (EMERGENCY)
Dept: HOSPITAL 14 - H.EROB2 | Age: 44
LOS: 1 days | Discharge: HOME | End: 2019-06-02
Payer: SELF-PAY

## 2019-06-01 VITALS — BODY MASS INDEX: 37.9 KG/M2

## 2019-06-01 DIAGNOSIS — Z3A.23: ICD-10-CM

## 2019-06-01 DIAGNOSIS — O23.42: Primary | ICD-10-CM

## 2019-06-01 LAB
BILIRUB UR-MCNC: NEGATIVE MG/DL
COLOR UR: YELLOW
GLUCOSE UR STRIP-MCNC: (no result) MG/DL
LEUKOCYTE ESTERASE UR-ACNC: (no result) LEU/UL
PH UR STRIP: 6 [PH] (ref 5–8)
PROT UR STRIP-MCNC: 30 MG/DL
RBC # UR STRIP: NEGATIVE /UL
SP GR UR STRIP: 1.02 (ref 1–1.03)
SQUAMOUS EPITHIAL: 1 /HPF (ref 0–5)
URINE CLARITY: (no result)
UROBILINOGEN UR-MCNC: (no result) MG/DL (ref 0.2–1)

## 2019-06-02 VITALS
TEMPERATURE: 98.3 F | HEART RATE: 80 BPM | DIASTOLIC BLOOD PRESSURE: 55 MMHG | SYSTOLIC BLOOD PRESSURE: 120 MMHG | RESPIRATION RATE: 18 BRPM

## 2019-06-02 NOTE — OBHP
===========================

Datetime: 2019 23:20

===========================

   

IP Adm Impression:  , intrauterine pregnancy; No Active Labor

IP Chief Complaint Other:  Pelvic pressure

IP Adm Impression Other:  Urinary Frequency with Pelvic Pressure

IP Admit Plan:  Observation/Evaluation

Admit Comment, IP Provider:  45yo  with intrauterine pregnancy at 22w6d. She presents to the [facility]
 with urinary frequency and intermittent pelvic pain. She denies any vaginal bleeding or leakage of f
luid.

   Patient has PNC at the [facility] and reports that pregnancy has been uncomplicated.

   OBHX: , all NSVDs

   PMHx: Non contributory

   Social Hx: No toxic habits X3

   S: afebrile

   Heart: RRR

   Chest: CTA B/l

   ABD: soft, BS- present, Mild tenderness in the suprapubic area with extension along the path of th
e round ligaments

   SSE: No bleeding or abnormal discharge seen in vagina. 

   Cx: 0/30/-3

      

   Assessment:

   IUP at 22w6d

   Round Ligament pain

   Urinary Frequency

      

   Plan:

   UA with reflex to urine culture

      

   UA Result: Negative

      

   Plan:

   D/c Home

   Pain precautions reviewed with patient

   Follow up with the clinic

      

Extremities - PN:  Normal

Abdomen - PN:  Normal

Back - PN:  Normal

Lungs - PN:  Normal

Neurologic - PN:  Normal

General - PN:  Normal

FHR - Baseline A Provider:  150s

Membranes, Provider:  Intact

Gestation - Est Wks by US:  22.6

EGA AdmitDate IP:  23.0

Vital Signs Provider:  Reviewed

IP Chief Complaint:  Signs/symptoms UTI; Other

Dilatation, Provider:  0

Effacement, Provider:  30

Station, Provider:  -3